# Patient Record
Sex: FEMALE | Race: OTHER | HISPANIC OR LATINO | URBAN - METROPOLITAN AREA
[De-identification: names, ages, dates, MRNs, and addresses within clinical notes are randomized per-mention and may not be internally consistent; named-entity substitution may affect disease eponyms.]

---

## 2020-08-12 ENCOUNTER — INPATIENT (INPATIENT)
Facility: HOSPITAL | Age: 55
LOS: 0 days | Discharge: HOME HEALTH | DRG: 177 | End: 2020-08-13
Attending: STUDENT IN AN ORGANIZED HEALTH CARE EDUCATION/TRAINING PROGRAM | Admitting: STUDENT IN AN ORGANIZED HEALTH CARE EDUCATION/TRAINING PROGRAM
Payer: COMMERCIAL

## 2020-08-12 VITALS
SYSTOLIC BLOOD PRESSURE: 119 MMHG | DIASTOLIC BLOOD PRESSURE: 84 MMHG | WEIGHT: 190.04 LBS | TEMPERATURE: 99 F | RESPIRATION RATE: 20 BRPM | HEART RATE: 95 BPM | HEIGHT: 64 IN | OXYGEN SATURATION: 95 %

## 2020-08-12 DIAGNOSIS — Z90.49 ACQUIRED ABSENCE OF OTHER SPECIFIED PARTS OF DIGESTIVE TRACT: Chronic | ICD-10-CM

## 2020-08-12 DIAGNOSIS — J18.9 PNEUMONIA, UNSPECIFIED ORGANISM: ICD-10-CM

## 2020-08-12 DIAGNOSIS — R74.0 NONSPECIFIC ELEVATION OF LEVELS OF TRANSAMINASE AND LACTIC ACID DEHYDROGENASE [LDH]: ICD-10-CM

## 2020-08-12 DIAGNOSIS — R19.7 DIARRHEA, UNSPECIFIED: ICD-10-CM

## 2020-08-12 DIAGNOSIS — E87.6 HYPOKALEMIA: ICD-10-CM

## 2020-08-12 DIAGNOSIS — R63.8 OTHER SYMPTOMS AND SIGNS CONCERNING FOOD AND FLUID INTAKE: ICD-10-CM

## 2020-08-12 DIAGNOSIS — U07.1 COVID-19: ICD-10-CM

## 2020-08-12 LAB
ALBUMIN SERPL ELPH-MCNC: 3.9 G/DL — SIGNIFICANT CHANGE UP (ref 3.3–5)
ALP SERPL-CCNC: 63 U/L — SIGNIFICANT CHANGE UP (ref 40–120)
ALT FLD-CCNC: 47 U/L — HIGH (ref 10–45)
ANION GAP SERPL CALC-SCNC: 17 MMOL/L — SIGNIFICANT CHANGE UP (ref 5–17)
APPEARANCE UR: CLEAR — SIGNIFICANT CHANGE UP
AST SERPL-CCNC: 62 U/L — HIGH (ref 10–40)
BASOPHILS # BLD AUTO: 0.02 K/UL — SIGNIFICANT CHANGE UP (ref 0–0.2)
BASOPHILS NFR BLD AUTO: 0.2 % — SIGNIFICANT CHANGE UP (ref 0–2)
BILIRUB SERPL-MCNC: 0.9 MG/DL — SIGNIFICANT CHANGE UP (ref 0.2–1.2)
BILIRUB UR-MCNC: NEGATIVE — SIGNIFICANT CHANGE UP
BUN SERPL-MCNC: 15 MG/DL — SIGNIFICANT CHANGE UP (ref 7–23)
CALCIUM SERPL-MCNC: 8.9 MG/DL — SIGNIFICANT CHANGE UP (ref 8.4–10.5)
CHLORIDE SERPL-SCNC: 97 MMOL/L — SIGNIFICANT CHANGE UP (ref 96–108)
CO2 SERPL-SCNC: 24 MMOL/L — SIGNIFICANT CHANGE UP (ref 22–31)
COLOR SPEC: YELLOW — SIGNIFICANT CHANGE UP
CREAT SERPL-MCNC: 0.91 MG/DL — SIGNIFICANT CHANGE UP (ref 0.5–1.3)
DIFF PNL FLD: ABNORMAL
EOSINOPHIL # BLD AUTO: 0.02 K/UL — SIGNIFICANT CHANGE UP (ref 0–0.5)
EOSINOPHIL NFR BLD AUTO: 0.2 % — SIGNIFICANT CHANGE UP (ref 0–6)
GLUCOSE SERPL-MCNC: 141 MG/DL — HIGH (ref 70–99)
GLUCOSE UR QL: NEGATIVE — SIGNIFICANT CHANGE UP
HCT VFR BLD CALC: 39 % — SIGNIFICANT CHANGE UP (ref 34.5–45)
HGB BLD-MCNC: 13 G/DL — SIGNIFICANT CHANGE UP (ref 11.5–15.5)
IMM GRANULOCYTES NFR BLD AUTO: 0.4 % — SIGNIFICANT CHANGE UP (ref 0–1.5)
KETONES UR-MCNC: NEGATIVE — SIGNIFICANT CHANGE UP
LEUKOCYTE ESTERASE UR-ACNC: NEGATIVE — SIGNIFICANT CHANGE UP
LYMPHOCYTES # BLD AUTO: 2.3 K/UL — SIGNIFICANT CHANGE UP (ref 1–3.3)
LYMPHOCYTES # BLD AUTO: 21.7 % — SIGNIFICANT CHANGE UP (ref 13–44)
MCHC RBC-ENTMCNC: 28.3 PG — SIGNIFICANT CHANGE UP (ref 27–34)
MCHC RBC-ENTMCNC: 33.3 GM/DL — SIGNIFICANT CHANGE UP (ref 32–36)
MCV RBC AUTO: 84.8 FL — SIGNIFICANT CHANGE UP (ref 80–100)
MONOCYTES # BLD AUTO: 0.56 K/UL — SIGNIFICANT CHANGE UP (ref 0–0.9)
MONOCYTES NFR BLD AUTO: 5.3 % — SIGNIFICANT CHANGE UP (ref 2–14)
NEUTROPHILS # BLD AUTO: 7.66 K/UL — HIGH (ref 1.8–7.4)
NEUTROPHILS NFR BLD AUTO: 72.2 % — SIGNIFICANT CHANGE UP (ref 43–77)
NITRITE UR-MCNC: NEGATIVE — SIGNIFICANT CHANGE UP
NRBC # BLD: 0 /100 WBCS — SIGNIFICANT CHANGE UP (ref 0–0)
PH UR: 6 — SIGNIFICANT CHANGE UP (ref 5–8)
PLATELET # BLD AUTO: 183 K/UL — SIGNIFICANT CHANGE UP (ref 150–400)
POTASSIUM SERPL-MCNC: 3.1 MMOL/L — LOW (ref 3.5–5.3)
POTASSIUM SERPL-SCNC: 3.1 MMOL/L — LOW (ref 3.5–5.3)
PROT SERPL-MCNC: 7.6 G/DL — SIGNIFICANT CHANGE UP (ref 6–8.3)
PROT UR-MCNC: ABNORMAL MG/DL
RBC # BLD: 4.6 M/UL — SIGNIFICANT CHANGE UP (ref 3.8–5.2)
RBC # FLD: 13.3 % — SIGNIFICANT CHANGE UP (ref 10.3–14.5)
SARS-COV-2 RNA SPEC QL NAA+PROBE: DETECTED
SODIUM SERPL-SCNC: 138 MMOL/L — SIGNIFICANT CHANGE UP (ref 135–145)
SP GR SPEC: 1.01 — SIGNIFICANT CHANGE UP (ref 1–1.03)
UROBILINOGEN FLD QL: 0.2 E.U./DL — SIGNIFICANT CHANGE UP
WBC # BLD: 10.6 K/UL — HIGH (ref 3.8–10.5)
WBC # FLD AUTO: 10.6 K/UL — HIGH (ref 3.8–10.5)

## 2020-08-12 PROCEDURE — 99285 EMERGENCY DEPT VISIT HI MDM: CPT

## 2020-08-12 PROCEDURE — 99223 1ST HOSP IP/OBS HIGH 75: CPT | Mod: GC

## 2020-08-12 PROCEDURE — 71045 X-RAY EXAM CHEST 1 VIEW: CPT | Mod: 26

## 2020-08-12 RX ORDER — REMDESIVIR 5 MG/ML
INJECTION INTRAVENOUS
Refills: 0 | Status: DISCONTINUED | OUTPATIENT
Start: 2020-08-12 | End: 2020-08-13

## 2020-08-12 RX ORDER — ACETAMINOPHEN 500 MG
650 TABLET ORAL ONCE
Refills: 0 | Status: COMPLETED | OUTPATIENT
Start: 2020-08-12 | End: 2020-08-12

## 2020-08-12 RX ORDER — POTASSIUM CHLORIDE 20 MEQ
40 PACKET (EA) ORAL EVERY 4 HOURS
Refills: 0 | Status: COMPLETED | OUTPATIENT
Start: 2020-08-12 | End: 2020-08-12

## 2020-08-12 RX ORDER — SODIUM CHLORIDE 9 MG/ML
1000 INJECTION INTRAMUSCULAR; INTRAVENOUS; SUBCUTANEOUS ONCE
Refills: 0 | Status: COMPLETED | OUTPATIENT
Start: 2020-08-12 | End: 2020-08-12

## 2020-08-12 RX ORDER — ENOXAPARIN SODIUM 100 MG/ML
40 INJECTION SUBCUTANEOUS EVERY 24 HOURS
Refills: 0 | Status: DISCONTINUED | OUTPATIENT
Start: 2020-08-12 | End: 2020-08-13

## 2020-08-12 RX ORDER — AZITHROMYCIN 500 MG/1
500 TABLET, FILM COATED ORAL ONCE
Refills: 0 | Status: COMPLETED | OUTPATIENT
Start: 2020-08-12 | End: 2020-08-12

## 2020-08-12 RX ORDER — ONDANSETRON 8 MG/1
4 TABLET, FILM COATED ORAL EVERY 6 HOURS
Refills: 0 | Status: DISCONTINUED | OUTPATIENT
Start: 2020-08-12 | End: 2020-08-13

## 2020-08-12 RX ORDER — SODIUM CHLORIDE 9 MG/ML
1000 INJECTION INTRAMUSCULAR; INTRAVENOUS; SUBCUTANEOUS
Refills: 0 | Status: DISCONTINUED | OUTPATIENT
Start: 2020-08-12 | End: 2020-08-12

## 2020-08-12 RX ORDER — CEFTRIAXONE 500 MG/1
1000 INJECTION, POWDER, FOR SOLUTION INTRAMUSCULAR; INTRAVENOUS ONCE
Refills: 0 | Status: COMPLETED | OUTPATIENT
Start: 2020-08-12 | End: 2020-08-12

## 2020-08-12 RX ADMIN — Medication 40 MILLIEQUIVALENT(S): at 23:07

## 2020-08-12 RX ADMIN — Medication 650 MILLIGRAM(S): at 16:20

## 2020-08-12 RX ADMIN — CEFTRIAXONE 100 MILLIGRAM(S): 500 INJECTION, POWDER, FOR SOLUTION INTRAMUSCULAR; INTRAVENOUS at 18:38

## 2020-08-12 RX ADMIN — ENOXAPARIN SODIUM 40 MILLIGRAM(S): 100 INJECTION SUBCUTANEOUS at 23:07

## 2020-08-12 RX ADMIN — Medication 40 MILLIEQUIVALENT(S): at 20:07

## 2020-08-12 RX ADMIN — Medication 650 MILLIGRAM(S): at 15:56

## 2020-08-12 RX ADMIN — SODIUM CHLORIDE 1000 MILLILITER(S): 9 INJECTION INTRAMUSCULAR; INTRAVENOUS; SUBCUTANEOUS at 15:56

## 2020-08-12 RX ADMIN — AZITHROMYCIN 500 MILLIGRAM(S): 500 TABLET, FILM COATED ORAL at 18:59

## 2020-08-12 RX ADMIN — SODIUM CHLORIDE 100 MILLILITER(S): 9 INJECTION INTRAMUSCULAR; INTRAVENOUS; SUBCUTANEOUS at 20:18

## 2020-08-12 NOTE — ED PROVIDER NOTE - CLINICAL SUMMARY MEDICAL DECISION MAKING FREE TEXT BOX
DDx: fever, viral syndrome  Afebrile, exam normal, O2 95%  - labs, cultures  - COVID swab  - CXR  - IVF, tylenol  - reassess  - likely d/c

## 2020-08-12 NOTE — ED PROVIDER NOTE - OBJECTIVE STATEMENT
55 y/o F with no PMHx presents to the Ed c/o intermittent fever x10 days a/w myalgia, dry cough, and runny nose. Pt was seen at [facility] on 8/5 and was diagnosed with viral syndrome. Started on amoxacillin 875mg. No cxr and covid pending. Reports subjective fever last night and feeling warm. Also has diarrhea x1 week. No abd pain, vomiting, CP, SOB, urinary sxs, rash. Using OTC meds to control feer. No known direct covid exposure and no recent travel.

## 2020-08-12 NOTE — H&P ADULT - PROBLEM SELECTOR PLAN 2
Patient reports diarrhea for Patient reports watery diarrhea and vomiting for the past week although denies diarrhea on the day of admission.  - monitor BMs  - start NS @ 100cc/hr, while pt not tolerating PO and still vomiting Pt presenting with 1.5 weeks fever, myalgias, productive cough, vomiting and diarrhea. Denies COVID exposures or sick contacts. Denies recent travel. No lymphopenia on CBC. L sided opacity seen on CXR.  - f/u COVID PCR  - f/u ferritin, CRP and D-dimer  - Avoid NSAIDs until COVID is ruled out Patient reports watery diarrhea and vomiting for the past week although denies diarrhea on the day of admission.  - monitor BMs Patient reports watery diarrhea and vomiting for the past week although denies diarrhea on the day of admission.  - monitor BMs  - Suspect 2/2 COVID-19

## 2020-08-12 NOTE — ED PROVIDER NOTE - PHYSICAL EXAMINATION
CONSTITUTIONAL:  Well appearing, well nourished, awake, alert, oriented to person, place, time/situation and in no apparent distress.  HENMT:  Head is atraumatic normocephalic.  External ears normal. Posterior pharynx normal and airway is patent.  Mucous membranes are moist. Neck is supple without edema or adenopathy.  No midline c-spine tenderness.   EYES:  EOMI, PERRLA, no sceral icterus, no conjunctival injection  CARDIAC:  Normal rate, regular rhythm.  Heart sounds S1, S2.  No murmurs, rubs or gallops.  RESPIRATORY:  Lungs are clear bilaterally with good aeration.  No respiratory distress.  Non-tachypneic and non-labored.  No accessory muscle use.  Speaking full sentences.  No bony chest wall tenderness   GASTROENTEROLOGY:  Soft, non-tender, non-distended.  There is no rebound or guarding.   No hernias or masses palpable. No pulsatile abdominal mass or bruits present.   MUSCULOSKELETAL:  Spine appears normal, range of motion is not limited, no muscle or joint tenderness.  No extremity edema, clubbing, tenderness, or evidence of trauma. Distal pulses 2+.  Cap refill is <2 seconds in all fingers and toes bilaterally.   NEUROLOGICAL:  Patient is alert, oriented x person, place and time.  Grossly non-focal neuro exam. Cranial nerves 2-12 are grossly intact.  Normal gait and speech.  5/5 bl upper extremity and lower extremity strength.  SKIN:  No rash. No lacerations or abrasions.

## 2020-08-12 NOTE — H&P ADULT - PROBLEM SELECTOR PLAN 5
F: none  E: replete as needed  N: Regular diet    DVT: none, improve score 0    Dispo: RMF Patient with AST 62 and ALT 47, all other LFTs wnl. May be d/t recent Amoxicillin use.  - monitor CMP  - f/u HepC Ab

## 2020-08-12 NOTE — H&P ADULT - PROBLEM SELECTOR PLAN 3
Patient with K of 3.1 on admission, likely in the setting of recent diarrhea.  - replete with K tablets  - monitor K in AM Patient with K of 3.1 on admission, likely in the setting of recent diarrhea.  - replete with K tablets  - monitor K in AM  - f/u EKG Patient reports watery diarrhea and vomiting for the past week although denies diarrhea on the day of admission.  - monitor BMs  - start NS @ 100cc/hr, while pt not tolerating PO and still vomiting Patient with K of 3.1 on admission, likely in the setting of recent diarrhea.  - Likely 2/2 Diarrhea   - replete with K tablets  - monitor K in AM  - f/u EKG

## 2020-08-12 NOTE — H&P ADULT - HISTORY OF PRESENT ILLNESS
54yoF with no pmhx presented with fever, myalgias, dry, cough for 1 week.     ED course:  Vitals - T 98.7, HR 82-95, -119/74-84, RR 18-20, spO2 94-95% on RA  Labs remarkable for WBC 10, K 3.1, AST 62, ALT 47, UA with trace blood and trace protein  EKG:  CXR: hazy lower left lung opacity  Meds: Azithromycin 500mg and ceftriaxone 1000mg  Patient admitted to [facility] for further management. 54yoF with no pmhx presented to [facility] ED with fever, myalgias, and cough. Patient states that symptoms began 1.5 weeks ago and started with a runny nose. Patient then developed productive cough with associated chest pain. Denies SOB. Patient states that her cough has become so intense that she has been vomiting frequently. Reports that vomit has been nonbloody but is bilious. Reports associated abdominal pain. Patient states that she has been having daily fevers, has not measure her temp at home. Patient also reports watery, nonbloody diarrhea that started 1 week ago with associated poor PO intake. Has been drinking     Patient denies sick contacts, COVID contacts, recent travel, environmental exposures. 2 dogs at home.    ED course:  Vitals - T 98.7, HR 82-95, -119/74-84, RR 18-20, spO2 94-95% on RA  Labs remarkable for WBC 10, K 3.1, AST 62, ALT 47, UA with trace blood and trace protein  EKG:  CXR: hazy lower left lung opacity  Meds: Azithromycin 500mg and ceftriaxone 1000mg  Patient admitted to [facility] for further management. 54yoF with no pmhx presented to [facility] ED with fever, myalgias, and cough. Patient states that symptoms began 1.5 weeks ago and started with a runny nose. Patient then developed productive cough with associated chest pain. Denies SOB. Patient states that her cough has become so intense that she has been vomiting frequently. Reports that vomit has been nonbloody but is bilious. Reports associated abdominal pain. Patient states that she has been having daily fevers, has not measure her temp at home. Patient also reports watery, nonbloody diarrhea that started 1 week ago, but has not had diarrhea at all today. Reports poor PO intake. Has been tolerating tea and states that she feels hungry. Patient states that she went to [facility] 1 week ago and was prescribed amoxicillin and fluticasone nasal spray but has not had relief of any symptoms.    Patient denies sick contacts, known COVID exposure, recent travel, environmental exposures. 2 dogs at home.    ED course:  Vitals - T 98.7, HR 82-95, -119/74-84, RR 18-20, spO2 94-95% on RA  Labs remarkable for WBC 10, K 3.1, AST 62, ALT 47, UA with trace blood and trace protein  CXR: hazy lower left lung opacity  Meds: Azithromycin 500mg and ceftriaxone 1000mg  Patient admitted to [facility] for further management.

## 2020-08-12 NOTE — H&P ADULT - PROBLEM SELECTOR PLAN 4
Patient with AST 62 and ALT 47, all other LFTs wnl. May be d/t recent Augmentin use.  - monitor CMP Patient with AST 62 and ALT 47, all other LFTs wnl. May be d/t recent Amoxicillin use.  - monitor CMP  - f/u HepC Ab Patient with K of 3.1 on admission, likely in the setting of recent diarrhea.  - replete with K tablets  - monitor K in AM  - f/u EKG Patient with AST 62 and ALT 47, all other LFTs wnl. May be d/t recent Amoxicillin use or d/t COVID.  - monitor CMP  - f/u HepC Ab Patient with AST 62 and ALT 47, all other LFTs wnl. May be d/t recent Amoxicillin (generally more a cholestatic picture) use or d/t COVID.  - monitor CMP  - f/u HepC Ab

## 2020-08-12 NOTE — ED ADULT NURSE NOTE - OBJECTIVE STATEMENT
Pt came to ED complaining of pleuritic chest pain, cough, intermittent SOB, fever, chills, N/V, diarrhea x9 days. Pt went for COVID swab but has not received results from [facility].  Pt is alert and oriented x3, speaking in complete, clear sentences.  Pt currently not SOB, ambulatory with even gait. Pt came to ED complaining of pleuritic chest pain, cough, intermittent SOB, fever, chills, N/V, diarrhea x9 days. Pt went for COVID swab but has not received results from [facility].  Pt is alert and oriented x3, speaking in complete, clear sentences.  Pt currently not SOB, ambulatory with even gait.  Pt has wet unproductive cough.

## 2020-08-12 NOTE — ED PROVIDER NOTE - ATTENDING CONTRIBUTION TO CARE
54F no PMH p/w 10d slowly worsening body aches, f/c, cough, mild SOB. Was at  8/5 and started on empiric amoxicillin, COVID test pending. No known sick contacts, smell/taste changes, NVD, abd pain, rashes, HA, black/bloody stool, focal weakness/numbness. Satting 95%, other vitals wnl, exam as above. Very well appearing.   In eD: Mild hypokalemia, other labs grossly wnl. CXR w/ L infiltrate. Pt remains well appearing but satting 94% on RA, 92% w/ ambulation. CXR findings c/w classic PNA rather than classic ground glass opacities seen w/ COVID. Will tx for CAP. Maintain covid precautions. Admit for further care.

## 2020-08-12 NOTE — PATIENT PROFILE ADULT - OVER THE PAST TWO WEEKS HAVE YOU FELT DOWN, DEPRESSED OR HOPELESS?
Please see [employer] message below and advise.   Myra Polk RN   [facility] - Triage    
no

## 2020-08-12 NOTE — H&P ADULT - ASSESSMENT
54F with pmhx 54F with no pmhx presents with 1.5 weeks of productive cough, fever, myalgias and 1 week of vomiting and diarrhea. 54F with no pmhx presents with 1.5 weeks of productive cough, fever, myalgias and 1 week of vomiting and diarrhea. Admitted to [facility] for further management. 54F with no pmhx presents with 1.5 weeks of productive cough, fever, myalgias and 1 week of vomiting and diarrhea. Admitted to [facility] for further management.

## 2020-08-12 NOTE — ED ADULT NURSE NOTE - NSIMPLEMENTINTERV_GEN_ALL_ED
Implemented All Universal Safety Interventions:  Solomon to call system. Call bell, personal items and telephone within reach. Instruct patient to call for assistance. Room bathroom lighting operational. Non-slip footwear when patient is off stretcher. Physically safe environment: no spills, clutter or unnecessary equipment. Stretcher in lowest position, wheels locked, appropriate side rails in place.

## 2020-08-12 NOTE — H&P ADULT - NSHPPHYSICALEXAM_GEN_ALL_CORE
VITAL SIGNS:  T(C): 36.8 (08-12-20 @ 18:49), Max: 37.1 (08-12-20 @ 15:10)  T(F): 98.3 (08-12-20 @ 18:49), Max: 98.7 (08-12-20 @ 15:10)  HR: 85 (08-12-20 @ 18:49) (82 - 95)  BP: 104/69 (08-12-20 @ 18:49) (104/69 - 119/84)  BP(mean): --  RR: 18 (08-12-20 @ 18:49) (18 - 20)  SpO2: 94% (08-12-20 @ 18:49) (94% - 95%)  Wt(kg): --    PHYSICAL EXAM:  Constitutional: WDWN resting comfortably in bed; NAD  Head: NC/AT  Eyes: PERRL, EOMI, anicteric sclera  ENT: no nasal discharge; uvula midline, no oropharyngeal erythema or exudates; MMM  Neck: supple; no JVD or thyromegaly  Respiratory: CTA B/L; no W/R/R, no retractions  Cardiac: +S1/S2; RRR; no M/R/G; PMI non-displaced  Gastrointestinal: abdomen soft, NT/ND; no rebound or guarding; +BSx4  Back: spine midline, no bony tenderness or step-offs; no CVAT B/L  Extremities: WWP, no clubbing or cyanosis; no peripheral edema  Musculoskeletal: NROM x4; no joint swelling, tenderness or erythema  Vascular: 2+ radial, femoral, DP/PT pulses B/L  Dermatologic: skin warm, dry and intact; no rashes, wounds, or scars  Lymphatic: no submandibular or cervical LAD  Neurologic: AAOx3; CNII-XII grossly intact; no focal deficits  Psychiatric: affect and characteristics of appearance, verbalizations, behaviors are appropriate VITAL SIGNS:  T(C): 36.8 (08-12-20 @ 18:49), Max: 37.1 (08-12-20 @ 15:10)  T(F): 98.3 (08-12-20 @ 18:49), Max: 98.7 (08-12-20 @ 15:10)  HR: 85 (08-12-20 @ 18:49) (82 - 95)  BP: 104/69 (08-12-20 @ 18:49) (104/69 - 119/84)  BP(mean): --  RR: 18 (08-12-20 @ 18:49) (18 - 20)  SpO2: 94% (08-12-20 @ 18:49) (94% - 95%)  Wt(kg): --    PHYSICAL EXAM:  Constitutional: Diaphoretic, appears uncomfortable  Head: NC/AT  Eyes: PERRL, EOMI, anicteric sclera  ENT: no nasal discharge; uvula midline, no oropharyngeal erythema or exudates; +dry MM  Neck: supple; no JVD or thyromegaly, no LAD  Respiratory: CTA B/L; no W/R/R, no retractions, frequent cough, no accessory muscle use, speaking in full sentences, satting 94-96% on RA  Cardiac: +S1/S2; RRR; no M/R/G; PMI non-displaced  Gastrointestinal: abdomen soft, diffusely tender to deep palpation, mostly in periumbilical area, ND; no rebound or guarding; +BSx4  Back: spine midline, no bony tenderness or step-offs  Extremities: WWP, b/l toes cool to touch, no clubbing or cyanosis; no peripheral edema  Musculoskeletal: NROM x4; no joint swelling, tenderness or erythema  Vascular: 2+ radial, DP/PT pulses B/L  Dermatologic: skin warm, dry and intact; no rashes, wounds, large well-healed scar over RUQ  Lymphatic: no submandibular or cervical LAD  Neurologic: AAOx3; CNII-XII grossly intact; no focal deficits  Psychiatric: affect and characteristics of appearance, verbalizations, behaviors are appropriate

## 2020-08-12 NOTE — H&P ADULT - NSHPLABSRESULTS_GEN_ALL_CORE
LABS:                         13.0   10.60 )-----------( 183      ( 12 Aug 2020 16:41 )             39.0         138  |  97  |  15  ----------------------------<  141<H>  3.1<L>   |  24  |  0.91    Ca    8.9      12 Aug 2020 16:41    TPro  7.6  /  Alb  3.9  /  TBili  0.9  /  DBili  x   /  AST  62<H>  /  ALT  47<H>  /  AlkPhos  63      Urinalysis Basic - ( 12 Aug 2020 17:44 )  Color: Yellow / Appearance: Clear / S.010 / pH: x  Gluc: x / Ketone: NEGATIVE  / Bili: Negative / Urobili: 0.2 E.U./dL   Blood: x / Protein: Trace mg/dL / Nitrite: NEGATIVE   Leuk Esterase: NEGATIVE / RBC: < 5 /HPF / WBC < 5 /HPF   Sq Epi: x / Non Sq Epi: 0-5 /HPF / Bacteria: Present /HPF    RADIOLOGY, EKG & ADDITIONAL TESTS: Reviewed.   < from: Xray Chest 1 View-PORTABLE IMMEDIATE (20 @ 16:55) >    Impression:  Hazy opacity in left lower lung, possibly infectious or inflammatory.

## 2020-08-12 NOTE — H&P ADULT - NSICDXPASTSURGICALHX_GEN_ALL_CORE_FT
No significant past surgical history PAST SURGICAL HISTORY:  S/P appendectomy     S/P cholecystectomy

## 2020-08-12 NOTE — H&P ADULT - PROBLEM SELECTOR PLAN 1
Patient presenting with fever, myalgias, dry cough. Went to [facility] and was prescribed Amoxicillin without relief of symptoms. On admission, pt is afebrile, not meeting SIRS criteria. HR elevated to 95. CXR showing LLL opacity. Pt received IV azithromycin and CTX in the ED. CURB-65 score is 0.  - c/w 1g CTX and 500mg Azithromycin IV daily  - f/u BCx  - f/u COVID PCR  - f/u urine legionella  - f/u sputum culture Patient presenting with fever, myalgias, dry cough. Went to [facility] and was prescribed Amoxicillin without relief of symptoms. On admission, pt is afebrile, not meeting SIRS criteria. HR elevated to 95. CXR showing LLL opacity. Pt received IV azithromycin and CTX in the ED. CURB-65 score is 0. COVID PCR positive.  - f/u BCx  - f/u ferritin, CRP and D-dimer  - Avoid NSAIDs Patient presenting with fever, myalgias, dry cough. Went to [facility] and was prescribed Amoxicillin without relief of symptoms. On admission, pt is afebrile, not meeting SIRS criteria. HR elevated to 95. CXR showing LLL opacity. Pt received IV azithromycin and CTX in the ED. CURB-65 score is 0. COVID PCR positive.  - Given hypoxia on RA - will give Remdesivir   - Monitor respiratory status   - F/u CXR  - f/u BCx  - F/u ferritin, CRP and D-dimer  - Continue with prophylactic Lovenox for now pending D-dimer   - Avoid NSAIDs

## 2020-08-13 ENCOUNTER — TRANSCRIPTION ENCOUNTER (OUTPATIENT)
Age: 55
End: 2020-08-13

## 2020-08-13 VITALS
RESPIRATION RATE: 18 BRPM | TEMPERATURE: 98 F | OXYGEN SATURATION: 94 % | DIASTOLIC BLOOD PRESSURE: 65 MMHG | HEART RATE: 82 BPM | SYSTOLIC BLOOD PRESSURE: 106 MMHG

## 2020-08-13 LAB
ALBUMIN SERPL ELPH-MCNC: 3 G/DL — LOW (ref 3.3–5)
ALP SERPL-CCNC: 56 U/L — SIGNIFICANT CHANGE UP (ref 40–120)
ALT FLD-CCNC: 37 U/L — SIGNIFICANT CHANGE UP (ref 10–45)
ANION GAP SERPL CALC-SCNC: 10 MMOL/L — SIGNIFICANT CHANGE UP (ref 5–17)
AST SERPL-CCNC: 46 U/L — HIGH (ref 10–40)
BILIRUB SERPL-MCNC: 0.5 MG/DL — SIGNIFICANT CHANGE UP (ref 0.2–1.2)
BUN SERPL-MCNC: 11 MG/DL — SIGNIFICANT CHANGE UP (ref 7–23)
CALCIUM SERPL-MCNC: 8.4 MG/DL — SIGNIFICANT CHANGE UP (ref 8.4–10.5)
CHLORIDE SERPL-SCNC: 105 MMOL/L — SIGNIFICANT CHANGE UP (ref 96–108)
CO2 SERPL-SCNC: 24 MMOL/L — SIGNIFICANT CHANGE UP (ref 22–31)
CREAT SERPL-MCNC: 0.6 MG/DL — SIGNIFICANT CHANGE UP (ref 0.5–1.3)
CRP SERPL-MCNC: 6.32 MG/DL — HIGH (ref 0–0.4)
D DIMER BLD IA.RAPID-MCNC: 218 NG/ML DDU — SIGNIFICANT CHANGE UP
FERRITIN SERPL-MCNC: 601 NG/ML — HIGH (ref 15–150)
GLUCOSE SERPL-MCNC: 108 MG/DL — HIGH (ref 70–99)
HCT VFR BLD CALC: 36.2 % — SIGNIFICANT CHANGE UP (ref 34.5–45)
HCV AB S/CO SERPL IA: 0.13 S/CO — SIGNIFICANT CHANGE UP
HCV AB SERPL-IMP: SIGNIFICANT CHANGE UP
HGB BLD-MCNC: 11.9 G/DL — SIGNIFICANT CHANGE UP (ref 11.5–15.5)
LEGIONELLA AG UR QL: NEGATIVE — SIGNIFICANT CHANGE UP
MAGNESIUM SERPL-MCNC: 2.5 MG/DL — SIGNIFICANT CHANGE UP (ref 1.6–2.6)
MCHC RBC-ENTMCNC: 28.3 PG — SIGNIFICANT CHANGE UP (ref 27–34)
MCHC RBC-ENTMCNC: 32.9 GM/DL — SIGNIFICANT CHANGE UP (ref 32–36)
MCV RBC AUTO: 86.2 FL — SIGNIFICANT CHANGE UP (ref 80–100)
NRBC # BLD: 0 /100 WBCS — SIGNIFICANT CHANGE UP (ref 0–0)
PLATELET # BLD AUTO: 165 K/UL — SIGNIFICANT CHANGE UP (ref 150–400)
POTASSIUM SERPL-MCNC: 4 MMOL/L — SIGNIFICANT CHANGE UP (ref 3.5–5.3)
POTASSIUM SERPL-SCNC: 4 MMOL/L — SIGNIFICANT CHANGE UP (ref 3.5–5.3)
PROT SERPL-MCNC: 6.4 G/DL — SIGNIFICANT CHANGE UP (ref 6–8.3)
RBC # BLD: 4.2 M/UL — SIGNIFICANT CHANGE UP (ref 3.8–5.2)
RBC # FLD: 13.4 % — SIGNIFICANT CHANGE UP (ref 10.3–14.5)
SODIUM SERPL-SCNC: 139 MMOL/L — SIGNIFICANT CHANGE UP (ref 135–145)
WBC # BLD: 7.87 K/UL — SIGNIFICANT CHANGE UP (ref 3.8–10.5)
WBC # FLD AUTO: 7.87 K/UL — SIGNIFICANT CHANGE UP (ref 3.8–10.5)

## 2020-08-13 PROCEDURE — 87449 NOS EACH ORGANISM AG IA: CPT

## 2020-08-13 PROCEDURE — 87040 BLOOD CULTURE FOR BACTERIA: CPT

## 2020-08-13 PROCEDURE — 99239 HOSP IP/OBS DSCHRG MGMT >30: CPT | Mod: GC

## 2020-08-13 PROCEDURE — 80053 COMPREHEN METABOLIC PANEL: CPT

## 2020-08-13 PROCEDURE — 85379 FIBRIN DEGRADATION QUANT: CPT

## 2020-08-13 PROCEDURE — 81001 URINALYSIS AUTO W/SCOPE: CPT

## 2020-08-13 PROCEDURE — 71045 X-RAY EXAM CHEST 1 VIEW: CPT | Mod: 26

## 2020-08-13 PROCEDURE — 85027 COMPLETE CBC AUTOMATED: CPT

## 2020-08-13 PROCEDURE — 96374 THER/PROPH/DIAG INJ IV PUSH: CPT

## 2020-08-13 PROCEDURE — 36415 COLL VENOUS BLD VENIPUNCTURE: CPT

## 2020-08-13 PROCEDURE — 87086 URINE CULTURE/COLONY COUNT: CPT

## 2020-08-13 PROCEDURE — 85025 COMPLETE CBC W/AUTO DIFF WBC: CPT

## 2020-08-13 PROCEDURE — 82728 ASSAY OF FERRITIN: CPT

## 2020-08-13 PROCEDURE — 87635 SARS-COV-2 COVID-19 AMP PRB: CPT

## 2020-08-13 PROCEDURE — 83735 ASSAY OF MAGNESIUM: CPT

## 2020-08-13 PROCEDURE — 99285 EMERGENCY DEPT VISIT HI MDM: CPT | Mod: 25

## 2020-08-13 PROCEDURE — 86803 HEPATITIS C AB TEST: CPT

## 2020-08-13 PROCEDURE — 71045 X-RAY EXAM CHEST 1 VIEW: CPT

## 2020-08-13 PROCEDURE — 86140 C-REACTIVE PROTEIN: CPT

## 2020-08-13 RX ORDER — FLUTICASONE PROPIONATE 50 MCG
1 SPRAY, SUSPENSION NASAL
Qty: 0 | Refills: 0 | DISCHARGE

## 2020-08-13 RX ORDER — REMDESIVIR 5 MG/ML
200 INJECTION INTRAVENOUS EVERY 24 HOURS
Refills: 0 | Status: COMPLETED | OUTPATIENT
Start: 2020-08-13 | End: 2020-08-13

## 2020-08-13 RX ORDER — ENOXAPARIN SODIUM 100 MG/ML
40 INJECTION SUBCUTANEOUS EVERY 12 HOURS
Refills: 0 | Status: DISCONTINUED | OUTPATIENT
Start: 2020-08-13 | End: 2020-08-13

## 2020-08-13 RX ORDER — ACETAMINOPHEN 500 MG
325 TABLET ORAL EVERY 4 HOURS
Refills: 0 | Status: DISCONTINUED | OUTPATIENT
Start: 2020-08-13 | End: 2020-08-13

## 2020-08-13 RX ORDER — ACETAMINOPHEN 500 MG
1 TABLET ORAL
Qty: 0 | Refills: 0 | DISCHARGE
Start: 2020-08-13

## 2020-08-13 RX ORDER — AMOXICILLIN 250 MG/5ML
1 SUSPENSION, RECONSTITUTED, ORAL (ML) ORAL
Qty: 0 | Refills: 0 | DISCHARGE

## 2020-08-13 RX ORDER — REMDESIVIR 5 MG/ML
INJECTION INTRAVENOUS
Refills: 0 | Status: DISCONTINUED | OUTPATIENT
Start: 2020-08-13 | End: 2020-08-13

## 2020-08-13 RX ADMIN — REMDESIVIR 500 MILLIGRAM(S): 5 INJECTION INTRAVENOUS at 01:18

## 2020-08-13 RX ADMIN — Medication 325 MILLIGRAM(S): at 09:42

## 2020-08-13 RX ADMIN — ENOXAPARIN SODIUM 40 MILLIGRAM(S): 100 INJECTION SUBCUTANEOUS at 10:35

## 2020-08-13 NOTE — DISCHARGE NOTE PROVIDER - NSDCMRMEDTOKEN_GEN_ALL_CORE_FT
amoxicillin 250 mg oral capsule: 1 cap(s) orally 3 times a day  fluticasone 50 mcg/inh nasal spray: 1 spray(s) nasal once a day acetaminophen 325 mg oral tablet: 1 tab(s) orally every 4 hours, As needed, Moderate Pain (4 - 6)

## 2020-08-13 NOTE — DISCHARGE NOTE PROVIDER - HOSPITAL COURSE
#Discharge: do not delete        Patient is 55 yo F with past medical history of gastritis and arthritis.    Presented with 10 days of cough, subjective fever, myalgia/arthralgia, n/v, diarrhea, loss of appetite and loss of smell, found to have COVID19 by COVID-PCR.    Problem List/Main Diagnoses (system-based):     1. COVID19    2. Gastritis    Inpatient treatment course:     Patient presented with 10d symptoms concerning for COVID19, including cough, subjective fevers, night sweats, nausea, vomiting, abdominal pain, loss of appetite, and loss of smell.     New medications:     Labs to be followed outpatient:     Exam to be followed outpatient: #Discharge: do not delete        Patient is 55 yo F with past medical history of gastritis and arthritis.    Presented with 10 days of cough, subjective fever, myalgia/arthralgia, n/v, diarrhea, loss of appetite and loss of smell, found to have COVID19 by COVID-PCR.    Problem List/Main Diagnoses (system-based):     1. COVID19     Inpatient treatment course:     Patient presented with 10d symptoms concerning for COVID19, including cough, subjective fevers, night sweats, nausea, vomiting, abdominal pain, loss of appetite, decreased PO intake, and loss of smell. Patient tested positive for COVID19 by PCR nasal swab. She had an elevated white count to 10.6, as well as elevated Ferritin and CRP. D-Dimer was normal.    She was treated with one dose of Remdesivir, and was placed on 2L nasal canula. Patient improved overnight clinically, and was able to be weaned back down to Room Air with normal oxygen saturation. Pt was also treated symptomatically with tylenol, zofran, and cough syrup.     She was discharged with a home pulse-ox device, with precautions to return to the ED should her oxygen saturation drop below 90, or should her symptoms worsen.     Patient should avoid NSAIDS/Naproxen during the duration of her illness.     The patient is medically optimized and hemodynamically stable. Proper follow up and return  precautions were discussed with the patient and she verbalized understanding. The patient is ready for discharge.     New medications: na    Labs to be followed outpatient: routine labs.     Exam to be followed outpatient: Routine.

## 2020-08-13 NOTE — PROGRESS NOTE ADULT - ATTENDING COMMENTS
I have personally seen, examined, and participated in the care of this patient. I have reviewed all pertinent clinical information including history, physical exam, plan, and the resident's note and agree except as noted    54F with no pmhx presents with 1.5 weeks of productive cough, fever, myalgias and 1 week of vomiting and diarrhea. Found to be COVID +  - Covid: pt with elevated CRP, however D Dimer and leukocytes WNL. The patient required oxygen on admission, however has now been weaned off oxygen. Received one dose of remdesivir. Will hold at this time as patient weaned off O2. Will trend inflammatory markers, and attempt to ambulate without oxygen  DVT: lovenox BID

## 2020-08-13 NOTE — PROGRESS NOTE ADULT - SUBJECTIVE AND OBJECTIVE BOX
### INCOMPLETE NOTE ###  ### INCOMPLETE NOTE ###    INTERVAL HPI/OVERNIGHT EVENTS:  Patient admitted overnight w/ 10 days of cough, subjective fevers, night sweats, malaise, myalgias, diarrhea (NB), abdominal pain, nausea, vomiting (NBNB), loss of apetite, decreased PO intake, loss of sense of smell.   Had dysuria during subjective fevers at home, now voiding comfortably.   COVID PCR positive.   Started on Remdesivir.     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   Lives with  and 3 adult daughters (working from home) and 6 year old granddaughter (has not started school yet. homer over summer).  works in catering.  Contacts have been alerted and will get tested today.     PMH:  Gastritis, on Omeprazole.   Arthritis knees - occasional naproxen.   Gallstones   PSurgX:  Cholecystectomy   1  (last child)   Appendicitis   Jaw surgery     SOC:  nonsmoker, no etOH, no drug use.      SUBJECTIVE: Patient seen and examined at bedside.  REVIEW OF SYSTEMS:  Positive for Headache 5/10, SOB, cough, pain with deep inspiration, myalgias, orthostatic dizziness, and mild abdominal pain which patient states has improved from before. Patient is hungry and would like to eat.     CONSTITUTIONAL: Unwell. Dizzy when sit up or stand up. Achy. HA 5/10 pain. nonfocal.   EYES/ENT: No visual changes;  No vertigo or throat pain   NECK: No pain or stiffness  RESPIRATORY: Feels short of breath, coughing, worse with inspiration, midsternal pain with inspiration.  CARDIOVASCULAR: No chest pain or palpitations  GASTROINTESTINAL: Abdominal pain (improved from before). No nausea, vomiting (resolved), or hematemesis; No diarrhea or constipation. No melena or hematochezia. Hungry  GENITOURINARY: No dysuria, frequency or hematuria.  NEUROLOGICAL: No numbness or weakness.  SKIN: No itching, rashes.      OBJECTIVE:    VITAL SIGNS:  ICU Vital Signs Last 24 Hrs  T(C): 37.3 (13 Aug 2020 05:35), Max: 37.7 (12 Aug 2020 22:27)  T(F): 99.1 (13 Aug 2020 05:35), Max: 99.8 (12 Aug 2020 22:27)  HR: 84 (13 Aug 2020 05:35) (82 - 95)  BP: 101/57 (13 Aug 2020 05:35) (101/57 - 126/60)  BP(mean): --  ABP: --  ABP(mean): --  RR: 18 (13 Aug 2020 05:35) (18 - 20)  SpO2: 95% (13 Aug 2020 05:35) (94% - 97%)         @ 07:01  -   @ 07:00  --------------------------------------------------------  IN: 150 mL / OUT: 500 mL / NET: -350 mL      CAPILLARY BLOOD GLUCOSE          PHYSICAL EXAM:    General: Resting in bed, in mild distress. On nasal canula.   HEENT: NC/AT; PERRL,. Conjunctival injection.   Neck: supple  Respiratory: Coughing frequently, more with speaking and inspiration. Difficulty with increased inspiration. Cannot take full inspirations due to discomfort. Mild wh  Cardiovascular: +S1/S2; RRR  Abdomen: soft, NT/ND; +BS x4  Extremities: 2+ peripheral pulses b/l; no LE edema  Skin: normal color and turgor; no rash  Neurological:   Psychiatry:    MEDICATIONS:  MEDICATIONS  (STANDING):  enoxaparin Injectable 40 milliGRAM(s) SubCutaneous every 12 hours  remdesivir  IVPB   IV Intermittent     MEDICATIONS  (PRN):  ondansetron Injectable 4 milliGRAM(s) IV Push every 6 hours PRN Nausea and/or Vomiting      ALLERGIES:  Allergies    No Known Allergies    Intolerances        LABS:                        11.9   7.87  )-----------( 165      ( 13 Aug 2020 06:56 )             36.2     08-13    139  |  105  |  11  ----------------------------<  108<H>  4.0   |  24  |  0.60    Ca    8.4      13 Aug 2020 06:56  Mg     2.5     08-13    TPro  6.4  /  Alb  3.0<L>  /  TBili  0.5  /  DBili  x   /  AST  46<H>  /  ALT  37  /  AlkPhos  56  08-      Urinalysis Basic - ( 12 Aug 2020 17:44 )    Color: Yellow / Appearance: Clear / S.010 / pH: x  Gluc: x / Ketone: NEGATIVE  / Bili: Negative / Urobili: 0.2 E.U./dL   Blood: x / Protein: Trace mg/dL / Nitrite: NEGATIVE   Leuk Esterase: NEGATIVE / RBC: < 5 /HPF / WBC < 5 /HPF   Sq Epi: x / Non Sq Epi: 0-5 /HPF / Bacteria: Present /HPF        RADIOLOGY & ADDITIONAL TESTS: Reviewed. INTERVAL HPI/OVERNIGHT EVENTS:  Patient admitted overnight w/ 10 days of cough, subjective fevers, night sweats, malaise, myalgias, diarrhea (NB), abdominal pain, nausea, vomiting (NBNB), loss of apetite, decreased PO intake, loss of sense of smell.   Had dysuria during subjective fevers at home, now voiding comfortably.   COVID PCR positive.   Started on Remdesivir.     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   Lives with  and 3 adult daughters (working from home) and 6 year old granddaughter (has not started school yet. homer over summer).  works in [employer].  Contacts have been alerted and will get tested today.     PMH:  Gastritis, on Omeprazole.   Arthritis knees - occasional naproxen.   Gallstones   PSurgX:  Cholecystectomy   1  (last child)   Appendicitis   Jaw surgery     SOC:  nonsmoker, no etOH, no drug use.      SUBJECTIVE: Patient seen and examined at bedside.  REVIEW OF SYSTEMS:  Positive for Headache 5/10, SOB, cough, pain with deep inspiration, myalgias, orthostatic dizziness, and mild abdominal pain which patient states has improved from before. Patient is hungry and would like to eat.     CONSTITUTIONAL: Unwell. Dizzy when sit up or stand up. Achy. HA 5/10 pain. nonfocal.   EYES/ENT: No visual changes;  No vertigo or throat pain   NECK: No pain or stiffness  RESPIRATORY: Feels short of breath, coughing, worse with inspiration, midsternal pain with inspiration.  CARDIOVASCULAR: No chest pain or palpitations  GASTROINTESTINAL: Abdominal pain (improved from before). No nausea, vomiting (resolved), or hematemesis; No diarrhea or constipation. No melena or hematochezia. Hungry  GENITOURINARY: No dysuria, frequency or hematuria.  NEUROLOGICAL: No numbness or weakness.  SKIN: No itching, rashes.      OBJECTIVE:    VITAL SIGNS:  ICU Vital Signs Last 24 Hrs  T(C): 37.3 (13 Aug 2020 05:35), Max: 37.7 (12 Aug 2020 22:27)  T(F): 99.1 (13 Aug 2020 05:35), Max: 99.8 (12 Aug 2020 22:27)  HR: 84 (13 Aug 2020 05:35) (82 - 95)  BP: 101/57 (13 Aug 2020 05:35) (101/57 - 126/60)  BP(mean): --  ABP: --  ABP(mean): --  RR: 18 (13 Aug 2020 05:35) (18 - 20)  SpO2: 95% (13 Aug 2020 05:35) (94% - 97%)         @ 07:01  -  08- @ 07:00  --------------------------------------------------------  IN: 150 mL / OUT: 500 mL / NET: -350 mL      CAPILLARY BLOOD GLUCOSE          PHYSICAL EXAM:    General: Resting in bed, in mild distress. On nasal canula.   HEENT: NC/AT; PERRL,. Conjunctival injection.   Neck: supple  Respiratory: Coughing frequently, more with speaking and inspiration. Difficulty with increased inspiration. Cannot take full inspirations due to discomfort. Mild wh  Cardiovascular: +S1/S2; RRR  Abdomen: soft, NT/ND; +BS x4  Extremities: 2+ peripheral pulses b/l; no LE edema  Skin: normal color and turgor; no rash  Neurological:   Psychiatry:    MEDICATIONS:  MEDICATIONS  (STANDING):  enoxaparin Injectable 40 milliGRAM(s) SubCutaneous every 12 hours  remdesivir  IVPB   IV Intermittent     MEDICATIONS  (PRN):  ondansetron Injectable 4 milliGRAM(s) IV Push every 6 hours PRN Nausea and/or Vomiting      ALLERGIES:  Allergies    No Known Allergies    Intolerances        LABS:                        11.9   7.87  )-----------( 165      ( 13 Aug 2020 06:56 )             36.2     0813    139  |  105  |  11  ----------------------------<  108<H>  4.0   |  24  |  0.60    Ca    8.4      13 Aug 2020 06:56  Mg     2.5         TPro  6.4  /  Alb  3.0<L>  /  TBili  0.5  /  DBili  x   /  AST  46<H>  /  ALT  37  /  AlkPhos  56  08-      Urinalysis Basic - ( 12 Aug 2020 17:44 )    Color: Yellow / Appearance: Clear / S.010 / pH: x  Gluc: x / Ketone: NEGATIVE  / Bili: Negative / Urobili: 0.2 E.U./dL   Blood: x / Protein: Trace mg/dL / Nitrite: NEGATIVE   Leuk Esterase: NEGATIVE / RBC: < 5 /HPF / WBC < 5 /HPF   Sq Epi: x / Non Sq Epi: 0-5 /HPF / Bacteria: Present /HPF        RADIOLOGY & ADDITIONAL TESTS: Reviewed.

## 2020-08-13 NOTE — DISCHARGE NOTE PROVIDER - NSDCCPCAREPLAN_GEN_ALL_CORE_FT
PRINCIPAL DISCHARGE DIAGNOSIS  Diagnosis: COVID-19  Assessment and Plan of Treatment: You tested positive for the Novel Coronavirus, also known as COVID-19. This test resulted from a nasal and oral (mouth) swab that was done durng your admission to [facility]. Your symptoms improved dramatically during your hospital stay. The main treatment for this virus is rest and adequate fluid intake.  You may continue to take Tylenol if you experience any fevers and Robitussin for cough.   If you start experiencing extreme shortness of breath, difficulty breathing resulting in the inability to even walk, please return to the hospital. We are also giving you a device called a pulse oxometer. Please check your oxygen saturation at home, and return to the hospital if your oxygen saturation drops BELOW 90%.   Please maintain a 2 week (14 day) quarantine in your apartment. Please ask all of your close contacts, including anyone you live with or have been in contact with in the past two weeks, to get tested for COVID19.   Please do not take any NSAIDS, including Naproxen, while you are recovering. There has been some data that these medications can worsed the recovery period. If you need to take something for pain control, please continue taking Tylenol.

## 2020-08-13 NOTE — DISCHARGE NOTE NURSING/CASE MANAGEMENT/SOCIAL WORK - PATIENT PORTAL LINK FT
You can access the FollowMyHealth Patient Portal offered by [facility] by registering at the following website: [email or web address]. By joining [facility]’s FollowMyHealth portal, you will also be able to view your health information using other applications (apps) compatible with our system.

## 2020-08-13 NOTE — PROGRESS NOTE ADULT - PROBLEM SELECTOR PLAN 1
Treated and stabilized, satting well on room air to high 90s, and low 90s during ambulation. The patient is medically optimized and hemodynamically stable.   - Tylenol & Robitussin for symptomatic relief  - d/c Remdesivir  - d/c w/ Pulse Ox and reutnr precautions for O2 sats <90% or worsening symptoms.   - d/c w/ quarantine and contact tracing recommendations.

## 2020-08-13 NOTE — PROGRESS NOTE ADULT - ASSESSMENT
54F with no pmhx presents with 10 days of productive cough, subective fever, nigh sweats, myalgias, vomiting, diarrhea, and loss of appetite/smell found to be COVID positive by nasal swab PCR.

## 2020-08-14 LAB
CULTURE RESULTS: NO GROWTH — SIGNIFICANT CHANGE UP
SPECIMEN SOURCE: SIGNIFICANT CHANGE UP

## 2020-08-17 LAB
CULTURE RESULTS: SIGNIFICANT CHANGE UP
CULTURE RESULTS: SIGNIFICANT CHANGE UP
SPECIMEN SOURCE: SIGNIFICANT CHANGE UP
SPECIMEN SOURCE: SIGNIFICANT CHANGE UP

## 2020-08-18 DIAGNOSIS — K29.70 GASTRITIS, UNSPECIFIED, WITHOUT BLEEDING: ICD-10-CM

## 2020-08-18 DIAGNOSIS — R74.0 NONSPECIFIC ELEVATION OF LEVELS OF TRANSAMINASE AND LACTIC ACID DEHYDROGENASE [LDH]: ICD-10-CM

## 2020-08-18 DIAGNOSIS — U07.1 COVID-19: ICD-10-CM

## 2020-08-18 DIAGNOSIS — J18.9 PNEUMONIA, UNSPECIFIED ORGANISM: ICD-10-CM

## 2020-08-18 DIAGNOSIS — E87.6 HYPOKALEMIA: ICD-10-CM

## 2020-08-18 DIAGNOSIS — M17.0 BILATERAL PRIMARY OSTEOARTHRITIS OF KNEE: ICD-10-CM

## 2020-08-18 DIAGNOSIS — J12.89 OTHER VIRAL PNEUMONIA: ICD-10-CM

## 2023-04-21 NOTE — ED ADULT NURSE NOTE - NSFALLRSKINDICATORS_ED_ALL_ED
[FreeTextEntry8] : 65 yo F w/ PMH of anxiety, thyroid and ovarian cancer, ocular migraines presents with L. sided headache for several months. Patient reports pain to be 8/10. Patient states the pain is perhaps worse at night but unsure. Patient states that she feels the headache everyday. Pain is worsened after chewing and improves with Advil. Patient states that the headache feels like a tooth ache. Patient saw the dentist around Easter time and was given course of amoxicillin for 10 days without significant improvement. Patient reports gradual hearing loss over the last few years b.l ears, given hearing aids but does not use them.  Pt reports grinding teeth at night. Denies temporal tenderness with palpation. Has not seen neuro in several years, cannot recall last imaging. Denies extremity weakness, paraesthesias, slurred speech, facial asymmetry.  no

## 2024-05-14 PROBLEM — Z78.9 OTHER SPECIFIED HEALTH STATUS: Chronic | Status: ACTIVE | Noted: 2020-08-12

## 2024-05-28 PROBLEM — Z00.00 ENCOUNTER FOR PREVENTIVE HEALTH EXAMINATION: Status: ACTIVE | Noted: 2024-05-28

## 2024-06-05 ENCOUNTER — APPOINTMENT (OUTPATIENT)
Dept: HEART AND VASCULAR | Facility: CLINIC | Age: 59
End: 2024-06-05